# Patient Record
Sex: FEMALE
[De-identification: names, ages, dates, MRNs, and addresses within clinical notes are randomized per-mention and may not be internally consistent; named-entity substitution may affect disease eponyms.]

---

## 2020-02-27 ENCOUNTER — ASOB RESULT (OUTPATIENT)
Age: 35
End: 2020-02-27

## 2020-02-27 ENCOUNTER — APPOINTMENT (OUTPATIENT)
Dept: ANTEPARTUM | Facility: CLINIC | Age: 35
End: 2020-02-27
Payer: COMMERCIAL

## 2020-02-27 PROBLEM — Z00.00 ENCOUNTER FOR PREVENTIVE HEALTH EXAMINATION: Status: ACTIVE | Noted: 2020-02-27

## 2020-02-27 PROCEDURE — 76817 TRANSVAGINAL US OBSTETRIC: CPT

## 2020-04-13 ENCOUNTER — APPOINTMENT (OUTPATIENT)
Dept: ANTEPARTUM | Facility: CLINIC | Age: 35
End: 2020-04-13
Payer: COMMERCIAL

## 2020-04-13 ENCOUNTER — APPOINTMENT (OUTPATIENT)
Dept: MATERNAL FETAL MEDICINE | Facility: CLINIC | Age: 35
End: 2020-04-13

## 2020-04-13 ENCOUNTER — ASOB RESULT (OUTPATIENT)
Age: 35
End: 2020-04-13

## 2020-04-13 VITALS — TEMPERATURE: 99.1 F

## 2020-04-13 PROCEDURE — 36416 COLLJ CAPILLARY BLOOD SPEC: CPT

## 2020-04-13 PROCEDURE — 76813 OB US NUCHAL MEAS 1 GEST: CPT

## 2020-04-16 LAB
1ST TRIMESTER DATA: NORMAL
ADDENDUM DOC: NORMAL
AFP PNL SERPL: NORMAL
AFP SERPL-ACNC: NORMAL
CLINICAL BIOCHEMIST REVIEW: NORMAL
FREE BETA HCG 1ST TRIMESTER: NORMAL
Lab: NORMAL
NOTES NTD: NORMAL
NT: NORMAL
PAPP-A SERPL-ACNC: NORMAL
TRISOMY 18/3: NORMAL

## 2020-06-08 ENCOUNTER — APPOINTMENT (OUTPATIENT)
Dept: ANTEPARTUM | Facility: CLINIC | Age: 35
End: 2020-06-08
Payer: COMMERCIAL

## 2020-06-08 ENCOUNTER — ASOB RESULT (OUTPATIENT)
Age: 35
End: 2020-06-08

## 2020-06-08 PROCEDURE — 76805 OB US >/= 14 WKS SNGL FETUS: CPT

## 2020-06-26 ENCOUNTER — APPOINTMENT (OUTPATIENT)
Dept: ANTEPARTUM | Facility: CLINIC | Age: 35
End: 2020-06-26

## 2020-08-31 ENCOUNTER — APPOINTMENT (OUTPATIENT)
Dept: ANTEPARTUM | Facility: CLINIC | Age: 35
End: 2020-08-31

## 2021-06-28 ENCOUNTER — APPOINTMENT (OUTPATIENT)
Dept: OBGYN | Facility: CLINIC | Age: 36
End: 2021-06-28

## 2021-12-22 ENCOUNTER — TRANSCRIPTION ENCOUNTER (OUTPATIENT)
Age: 36
End: 2021-12-22

## 2022-04-19 ENCOUNTER — APPOINTMENT (OUTPATIENT)
Dept: OBGYN | Facility: CLINIC | Age: 37
End: 2022-04-19
Payer: COMMERCIAL

## 2022-04-19 VITALS
BODY MASS INDEX: 25.58 KG/M2 | HEIGHT: 62 IN | DIASTOLIC BLOOD PRESSURE: 62 MMHG | WEIGHT: 139 LBS | SYSTOLIC BLOOD PRESSURE: 110 MMHG

## 2022-04-19 DIAGNOSIS — Z82.49 FAMILY HISTORY OF ISCHEMIC HEART DISEASE AND OTHER DISEASES OF THE CIRCULATORY SYSTEM: ICD-10-CM

## 2022-04-19 DIAGNOSIS — Z78.9 OTHER SPECIFIED HEALTH STATUS: ICD-10-CM

## 2022-04-19 DIAGNOSIS — Z80.41 FAMILY HISTORY OF MALIGNANT NEOPLASM OF OVARY: ICD-10-CM

## 2022-04-19 DIAGNOSIS — Z80.3 FAMILY HISTORY OF MALIGNANT NEOPLASM OF BREAST: ICD-10-CM

## 2022-04-19 DIAGNOSIS — N84.1 POLYP OF CERVIX UTERI: ICD-10-CM

## 2022-04-19 DIAGNOSIS — Z01.411 ENCOUNTER FOR GYNECOLOGICAL EXAMINATION (GENERAL) (ROUTINE) WITH ABNORMAL FINDINGS: ICD-10-CM

## 2022-04-19 DIAGNOSIS — Z80.0 FAMILY HISTORY OF MALIGNANT NEOPLASM OF DIGESTIVE ORGANS: ICD-10-CM

## 2022-04-19 PROCEDURE — 57500 BIOPSY OF CERVIX: CPT

## 2022-04-19 PROCEDURE — 99385 PREV VISIT NEW AGE 18-39: CPT | Mod: 25

## 2022-04-26 PROBLEM — Z80.41 FAMILY HISTORY OF MALIGNANT NEOPLASM OF OVARY: Status: ACTIVE | Noted: 2022-04-19

## 2022-04-26 PROBLEM — Z80.3 FAMILY HISTORY OF MALIGNANT NEOPLASM OF BREAST: Status: ACTIVE | Noted: 2022-04-19

## 2022-04-26 PROBLEM — Z80.0 FAMILY HISTORY OF PANCREATIC CANCER: Status: ACTIVE | Noted: 2022-04-19

## 2022-04-26 PROBLEM — Z82.49 FAMILY HISTORY OF HYPERTENSION: Status: ACTIVE | Noted: 2022-04-19

## 2022-04-26 PROBLEM — Z78.9 NO PERTINENT PAST MEDICAL HISTORY: Status: RESOLVED | Noted: 2022-04-19 | Resolved: 2022-04-26

## 2022-04-28 ENCOUNTER — NON-APPOINTMENT (OUTPATIENT)
Age: 37
End: 2022-04-28

## 2022-04-28 DIAGNOSIS — O35.1XX0 MATERNAL CARE FOR (SUSPECTED) CHROMOSOMAL ABNORMALITY IN FETUS, NOT APPLICABLE OR UNSPECIFIED: ICD-10-CM

## 2022-04-28 LAB — CORE LAB BIOPSY: NORMAL

## 2022-04-29 ENCOUNTER — NON-APPOINTMENT (OUTPATIENT)
Age: 37
End: 2022-04-29

## 2024-03-29 ENCOUNTER — EMERGENCY (EMERGENCY)
Facility: HOSPITAL | Age: 39
LOS: 1 days | Discharge: HOME | End: 2024-03-29
Attending: EMERGENCY MEDICINE | Admitting: EMERGENCY MEDICINE
Payer: COMMERCIAL

## 2024-03-29 VITALS
SYSTOLIC BLOOD PRESSURE: 116 MMHG | OXYGEN SATURATION: 100 % | RESPIRATION RATE: 16 BRPM | TEMPERATURE: 98 F | DIASTOLIC BLOOD PRESSURE: 80 MMHG | HEART RATE: 99 BPM

## 2024-03-29 VITALS
RESPIRATION RATE: 20 BRPM | OXYGEN SATURATION: 98 % | SYSTOLIC BLOOD PRESSURE: 135 MMHG | WEIGHT: 134.92 LBS | DIASTOLIC BLOOD PRESSURE: 84 MMHG | TEMPERATURE: 98 F | HEART RATE: 103 BPM

## 2024-03-29 LAB
ALBUMIN SERPL ELPH-MCNC: 4.4 G/DL — SIGNIFICANT CHANGE UP (ref 3.3–5)
ALP SERPL-CCNC: 54 U/L — SIGNIFICANT CHANGE UP (ref 40–120)
ALT FLD-CCNC: 10 U/L — SIGNIFICANT CHANGE UP (ref 4–33)
ANION GAP SERPL CALC-SCNC: 13 MMOL/L — SIGNIFICANT CHANGE UP (ref 7–14)
APPEARANCE UR: CLEAR — SIGNIFICANT CHANGE UP
AST SERPL-CCNC: 19 U/L — SIGNIFICANT CHANGE UP (ref 4–32)
BACTERIA # UR AUTO: NEGATIVE /HPF — SIGNIFICANT CHANGE UP
BASOPHILS # BLD AUTO: 0.03 K/UL — SIGNIFICANT CHANGE UP (ref 0–0.2)
BASOPHILS NFR BLD AUTO: 0.3 % — SIGNIFICANT CHANGE UP (ref 0–2)
BILIRUB SERPL-MCNC: 0.4 MG/DL — SIGNIFICANT CHANGE UP (ref 0.2–1.2)
BILIRUB UR-MCNC: NEGATIVE — SIGNIFICANT CHANGE UP
BLD GP AB SCN SERPL QL: NEGATIVE — SIGNIFICANT CHANGE UP
BUN SERPL-MCNC: 8 MG/DL — SIGNIFICANT CHANGE UP (ref 7–23)
CALCIUM SERPL-MCNC: 9.5 MG/DL — SIGNIFICANT CHANGE UP (ref 8.4–10.5)
CAST: 0 /LPF — SIGNIFICANT CHANGE UP (ref 0–4)
CHLORIDE SERPL-SCNC: 105 MMOL/L — SIGNIFICANT CHANGE UP (ref 98–107)
CO2 SERPL-SCNC: 22 MMOL/L — SIGNIFICANT CHANGE UP (ref 22–31)
COLOR SPEC: YELLOW — SIGNIFICANT CHANGE UP
CREAT SERPL-MCNC: 0.59 MG/DL — SIGNIFICANT CHANGE UP (ref 0.5–1.3)
DIFF PNL FLD: ABNORMAL
EGFR: 119 ML/MIN/1.73M2 — SIGNIFICANT CHANGE UP
EOSINOPHIL # BLD AUTO: 0.02 K/UL — SIGNIFICANT CHANGE UP (ref 0–0.5)
EOSINOPHIL NFR BLD AUTO: 0.2 % — SIGNIFICANT CHANGE UP (ref 0–6)
GLUCOSE SERPL-MCNC: 94 MG/DL — SIGNIFICANT CHANGE UP (ref 70–99)
GLUCOSE UR QL: NEGATIVE MG/DL — SIGNIFICANT CHANGE UP
HCG SERPL-ACNC: 15.7 MIU/ML — SIGNIFICANT CHANGE UP
HCT VFR BLD CALC: 35.4 % — SIGNIFICANT CHANGE UP (ref 34.5–45)
HGB BLD-MCNC: 12.7 G/DL — SIGNIFICANT CHANGE UP (ref 11.5–15.5)
IANC: 7.89 K/UL — HIGH (ref 1.8–7.4)
IMM GRANULOCYTES NFR BLD AUTO: 0.4 % — SIGNIFICANT CHANGE UP (ref 0–0.9)
KETONES UR-MCNC: NEGATIVE MG/DL — SIGNIFICANT CHANGE UP
LEUKOCYTE ESTERASE UR-ACNC: ABNORMAL
LYMPHOCYTES # BLD AUTO: 1.64 K/UL — SIGNIFICANT CHANGE UP (ref 1–3.3)
LYMPHOCYTES # BLD AUTO: 16.4 % — SIGNIFICANT CHANGE UP (ref 13–44)
MCHC RBC-ENTMCNC: 26.9 PG — LOW (ref 27–34)
MCHC RBC-ENTMCNC: 35.9 GM/DL — SIGNIFICANT CHANGE UP (ref 32–36)
MCV RBC AUTO: 75 FL — LOW (ref 80–100)
MONOCYTES # BLD AUTO: 0.38 K/UL — SIGNIFICANT CHANGE UP (ref 0–0.9)
MONOCYTES NFR BLD AUTO: 3.8 % — SIGNIFICANT CHANGE UP (ref 2–14)
NEUTROPHILS # BLD AUTO: 7.89 K/UL — HIGH (ref 1.8–7.4)
NEUTROPHILS NFR BLD AUTO: 78.9 % — HIGH (ref 43–77)
NITRITE UR-MCNC: NEGATIVE — SIGNIFICANT CHANGE UP
NRBC # BLD: 0 /100 WBCS — SIGNIFICANT CHANGE UP (ref 0–0)
NRBC # FLD: 0 K/UL — SIGNIFICANT CHANGE UP (ref 0–0)
PH UR: 6.5 — SIGNIFICANT CHANGE UP (ref 5–8)
PLATELET # BLD AUTO: 219 K/UL — SIGNIFICANT CHANGE UP (ref 150–400)
POTASSIUM SERPL-MCNC: 3.8 MMOL/L — SIGNIFICANT CHANGE UP (ref 3.5–5.3)
POTASSIUM SERPL-SCNC: 3.8 MMOL/L — SIGNIFICANT CHANGE UP (ref 3.5–5.3)
PROT SERPL-MCNC: 7.3 G/DL — SIGNIFICANT CHANGE UP (ref 6–8.3)
PROT UR-MCNC: SIGNIFICANT CHANGE UP MG/DL
RBC # BLD: 4.72 M/UL — SIGNIFICANT CHANGE UP (ref 3.8–5.2)
RBC # FLD: 13.7 % — SIGNIFICANT CHANGE UP (ref 10.3–14.5)
RBC CASTS # UR COMP ASSIST: 27 /HPF — HIGH (ref 0–4)
RH IG SCN BLD-IMP: POSITIVE — SIGNIFICANT CHANGE UP
SODIUM SERPL-SCNC: 140 MMOL/L — SIGNIFICANT CHANGE UP (ref 135–145)
SP GR SPEC: 1.01 — SIGNIFICANT CHANGE UP (ref 1–1.03)
SQUAMOUS # UR AUTO: 0 /HPF — SIGNIFICANT CHANGE UP (ref 0–5)
UROBILINOGEN FLD QL: 0.2 MG/DL — SIGNIFICANT CHANGE UP (ref 0.2–1)
WBC # BLD: 10 K/UL — SIGNIFICANT CHANGE UP (ref 3.8–10.5)
WBC # FLD AUTO: 10 K/UL — SIGNIFICANT CHANGE UP (ref 3.8–10.5)
WBC UR QL: 2 /HPF — SIGNIFICANT CHANGE UP (ref 0–5)

## 2024-03-29 PROCEDURE — 93010 ELECTROCARDIOGRAM REPORT: CPT

## 2024-03-29 PROCEDURE — 99284 EMERGENCY DEPT VISIT MOD MDM: CPT

## 2024-03-29 PROCEDURE — 76817 TRANSVAGINAL US OBSTETRIC: CPT | Mod: 26

## 2024-03-29 NOTE — ED PROVIDER NOTE - PHYSICAL EXAMINATION
GENERAL: NAD  HEAD: normocephalic, atraumatic  HEENT: normal conjunctiva, oral mucosa moist, uvula midline, neck supple  CARDIAC: regular rate and rhythm, normal S1S2, no appreciable murmurs  PULM: speaking in full sentences, normal breath sounds, clear to ascultation bilaterally, no rales, rhonchi, wheezing  GI: abdomen nondistended, soft, nontender  : no CVA tenderness b/l, mild suprapubic tenderness, vaginal exam with blood in vaginal vault, no CMT, closed cervical os  NEURO: moving all 4 extremities, no focal deficits, normal speech, AOx3  MSK: no peripheral edema, no calf tenderness b/l  SKIN: well-perfused, extremities warm  PSYCH: appropriate mood and affect

## 2024-03-29 NOTE — ED PROVIDER NOTE - PATIENT PORTAL LINK FT
You can access the FollowMyHealth Patient Portal offered by [facility] by registering at the following website: [email or web address]. By joining [facility]’s FollowMyHealth portal, you will also be able to view your health information using other applications (apps) compatible with our system.

## 2024-03-29 NOTE — ED ADULT NURSE NOTE - OBJECTIVE STATEMENT
Pt received to intake presents about 4 weeks pregnant with c/c of vaginal bleeding and abd cramping, pregnancy confirmed via home pregnancy test. Pt a&ox4, ambulatory at baseline, skin intact, respirations even and unlabored, 20g IV placed in left arm labs drawn and sent. denies chest pain, dizziness, sob ,or any other symptoms.

## 2024-03-29 NOTE — ED PROVIDER NOTE - NSFOLLOWUPINSTRUCTIONS_ED_ALL_ED_FT
Today in the emergency department you were evaluated for vaginal bleeding in early pregnancy.  Please follow-up with your OB/GYN on Monday for repeat blood test as well as ultrasound.  Please bring a copy of your results with you.  Please return to the emergency department for worsening of your symptoms.    You may take Acetaminophen over the counter as needed for pain and/or fever. Use as directed and see medication warnings.    DISCHARGE INSTRUCTIONS:  Seek care immediately if:  You develop a severe headache that does not go away.  You have new or increased vision changes, such as blurred or spotted vision  You have new or increased swelling in your face or hands.  You have pain or cramping in your abdomen or low back.  You have increased vaginal bleeding.    Call your doctor or obstetrician if:  You have abdominal cramps, pressure, or tightening.  You have a change in vaginal discharge.  You cannot keep food or drinks down, and you are losing weight.  You have chills or a fever.  You have vaginal itching, burning, or pain.  You have yellow, green, white, or foul-smelling vaginal discharge.  You have pain or burning when you urinate, less urine than usual, or pink or bloody urine.  You have questions or concerns about your condition or care.

## 2024-03-29 NOTE — ED PROVIDER NOTE - CLINICAL SUMMARY MEDICAL DECISION MAKING FREE TEXT BOX
38-year-old female G2, P1 presenting with vaginal bleeding for the last 2 days. Concern for missed  versus subchorionic hemorrhage.  Labs, TVUS, UA/urine culture ordered.

## 2024-03-29 NOTE — ED PROVIDER NOTE - DISPOSITION TYPE
DISCHARGE How Severe Is Your Skin Lesion?: moderate Has Your Skin Lesion Been Treated?: not been treated Is This A New Presentation, Or A Follow-Up?: Growth

## 2024-03-29 NOTE — ED ADULT TRIAGE NOTE - CHIEF COMPLAINT QUOTE
Patient presents to ED with complaints of vaginal bleed. Reports receiving 4 positive at home pregnancy tests, approximately 4 weeks and 3 days pregnant as per PCP. Patient admits to using 1 pad since 11am, denies any dizziness, lightheadedness or SOB at this time. Respirations equal and unlabored on room air, patient able to speak in clear sentences.

## 2024-03-29 NOTE — ED PROVIDER NOTE - PROGRESS NOTE DETAILS
Lab work notable for hCG 15.7.  TVUS without evidence of intrauterine or extrauterine gestation.  UA without evidence of acute urinary tract infection.  Dispo to home with OB/GYN follow-up.

## 2024-03-29 NOTE — ED PROVIDER NOTE - OBJECTIVE STATEMENT
38-year-old female G2, P1 presenting with vaginal bleeding for the last 2 days.  Over the last few weeks patient has had 4 positive pregnancy tests.  She is approximately 4 weeks and her LMP.  However last night began experiencing vaginal bleeding.  Given the bleeding with her positive home pregnancy test she presented for further evaluation.  She has had cramping pelvic pain however no other headache, chest pain, shortness of breath, N/V/D, dysuria, peripheral edema, fever/chills.

## 2024-03-29 NOTE — ED PROVIDER NOTE - ATTENDING CONTRIBUTION TO CARE
DR. BLOCH, ATTENDING MD-  I performed a face to face bedside interview with patient regarding history of present illness, review of symptoms and past medical history. I completed an independent physical exam.  I have discussed patient's plan of care with the resident.  Patient well appearing NAD HEENT nml heart s1s2, lungs clear, abd soft nontender, pelvic supervised, minimal bleeding, os closed

## 2024-04-01 ENCOUNTER — APPOINTMENT (OUTPATIENT)
Dept: OBGYN | Facility: CLINIC | Age: 39
End: 2024-04-01
Payer: COMMERCIAL

## 2024-04-01 ENCOUNTER — ASOB RESULT (OUTPATIENT)
Age: 39
End: 2024-04-01

## 2024-04-01 VITALS
DIASTOLIC BLOOD PRESSURE: 81 MMHG | HEIGHT: 62 IN | SYSTOLIC BLOOD PRESSURE: 131 MMHG | WEIGHT: 135 LBS | BODY MASS INDEX: 24.84 KG/M2 | HEART RATE: 80 BPM

## 2024-04-01 DIAGNOSIS — O36.80X0 PREGNANCY WITH INCONCLUSIVE FETAL VIABILITY, NOT APPLICABLE OR UNSPECIFIED: ICD-10-CM

## 2024-04-01 DIAGNOSIS — O03.9 COMPLETE OR UNSPECIFIED SPONTANEOUS ABORTION W/OUT COMPLICATION: ICD-10-CM

## 2024-04-01 DIAGNOSIS — N63.0 UNSPECIFIED LUMP IN UNSPECIFIED BREAST: ICD-10-CM

## 2024-04-01 PROCEDURE — 76817 TRANSVAGINAL US OBSTETRIC: CPT

## 2024-04-01 PROCEDURE — 99213 OFFICE O/P EST LOW 20 MIN: CPT | Mod: 25

## 2024-04-02 LAB — HCG SERPL-MCNC: 2 MIU/ML

## 2024-04-22 ENCOUNTER — APPOINTMENT (OUTPATIENT)
Dept: OBGYN | Facility: CLINIC | Age: 39
End: 2024-04-22

## 2024-04-30 ENCOUNTER — APPOINTMENT (OUTPATIENT)
Dept: OBGYN | Facility: CLINIC | Age: 39
End: 2024-04-30

## 2024-05-21 ENCOUNTER — APPOINTMENT (OUTPATIENT)
Dept: OBGYN | Facility: CLINIC | Age: 39
End: 2024-05-21

## 2024-07-29 ENCOUNTER — NON-APPOINTMENT (OUTPATIENT)
Age: 39
End: 2024-07-29

## 2024-08-19 ENCOUNTER — APPOINTMENT (OUTPATIENT)
Dept: OBGYN | Facility: CLINIC | Age: 39
End: 2024-08-19

## 2024-08-19 VITALS
HEART RATE: 93 BPM | BODY MASS INDEX: 25.21 KG/M2 | HEIGHT: 62 IN | WEIGHT: 137 LBS | SYSTOLIC BLOOD PRESSURE: 117 MMHG | DIASTOLIC BLOOD PRESSURE: 75 MMHG

## 2024-08-19 DIAGNOSIS — Z12.4 ENCOUNTER FOR SCREENING FOR MALIGNANT NEOPLASM OF CERVIX: ICD-10-CM

## 2024-08-19 DIAGNOSIS — Z11.51 ENCOUNTER FOR SCREENING FOR HUMAN PAPILLOMAVIRUS (HPV): ICD-10-CM

## 2024-08-19 DIAGNOSIS — N76.0 ACUTE VAGINITIS: ICD-10-CM

## 2024-08-19 PROCEDURE — 99203 OFFICE O/P NEW LOW 30 MIN: CPT | Mod: 25

## 2024-08-19 PROCEDURE — G0444 DEPRESSION SCREEN ANNUAL: CPT | Mod: 59

## 2024-08-19 PROCEDURE — 99395 PREV VISIT EST AGE 18-39: CPT

## 2024-08-19 PROCEDURE — 99213 OFFICE O/P EST LOW 20 MIN: CPT | Mod: 25

## 2024-08-19 PROCEDURE — 99459 PELVIC EXAMINATION: CPT

## 2024-08-20 RX ORDER — FLUCONAZOLE 150 MG/1
150 TABLET ORAL
Qty: 2 | Refills: 0 | Status: ACTIVE | COMMUNITY
Start: 2024-08-20

## 2024-08-20 RX ORDER — NYSTATIN AND TRIAMCINOLONE ACETONIDE 100000; 1 MG/G; MG/G
100000-0.1 CREAM TOPICAL TWICE DAILY
Qty: 1 | Refills: 1 | Status: ACTIVE | COMMUNITY
Start: 2024-08-20 | End: 1900-01-01

## 2024-08-21 LAB — HPV HIGH+LOW RISK DNA PNL CVX: NOT DETECTED

## 2024-08-26 ENCOUNTER — NON-APPOINTMENT (OUTPATIENT)
Age: 39
End: 2024-08-26

## 2024-08-26 LAB
A VAGINAE DNA VAG QL NAA+PROBE: NORMAL
BVAB2 DNA VAG QL NAA+PROBE: NORMAL
C KRUSEI DNA VAG QL NAA+PROBE: NEGATIVE
C TRACH RRNA SPEC QL NAA+PROBE: NEGATIVE
CANDIDA DNA VAG QL NAA+PROBE: NEGATIVE
CYTOLOGY CVX/VAG DOC THIN PREP: NORMAL
MEGA1 DNA VAG QL NAA+PROBE: NORMAL
N GONORRHOEA RRNA SPEC QL NAA+PROBE: NEGATIVE
T VAGINALIS RRNA SPEC QL NAA+PROBE: NEGATIVE

## 2024-08-28 NOTE — HISTORY OF PRESENT ILLNESS
[Patient reported mammogram was abnormal] : Patient reported mammogram was abnormal [Patient reported breast sonogram was normal] : Patient reported breast sonogram was normal [Patient reported PAP Smear was normal] : Patient reported PAP Smear was normal [N] : Patient does not use contraception [Y] : Patient is sexually active [No] : Patient does not have concerns regarding sex [Currently Active] : currently active [Men] : men [TextBox_4] :  39-year-old female patient presenting for annual well woman exam and evaluation of vaginal discharge. [Mammogramdate] : 1/3/24 [TextBox_19] : 6 month f/up 7/29/24 neg  [BreastSonogramDate] : 1/3/24 [PapSmeardate] : 4/12/23 [HPVDate] : 4/12/23 [LMPDate] : 8/4/24 [FreeTextEntry1] : 8/4/24

## 2024-08-28 NOTE — PHYSICAL EXAM
[Chaperone Present] : A chaperone was present in the examining room during all aspects of the physical examination [97627] : A chaperone was present during the pelvic exam. [Appropriately responsive] : appropriately responsive [Alert] : alert [No Acute Distress] : no acute distress [Oriented x3] : oriented x3 [Examination Of The Breasts] : a normal appearance [No Masses] : no breast masses were palpable [Labia Majora] : normal [Labia Minora] : normal [Discharge] : a  ~M vaginal discharge was present [Moderate] : moderate [White] : white [Curds] : curd-like [Normal] : normal [Uterine Adnexae] : normal [No Lymphadenopathy] : no lymphadenopathy [Soft] : soft [Non-tender] : non-tender [Non-distended] : non-distended [Breast Palpation Diffuse Fibrous Tissue Bilateral] : fibrocystic changes [FreeTextEntry2] : MIRIAM Munguia [FreeTextEntry6] : Breast exam was unremarkable with no palpable masses or lymphadenopathy. [FreeTextEntry4] : vaginal discharge that is white, clumpy, and sticks to the vaginal walls s/p trip to [place] and lots of water sports

## 2024-08-28 NOTE — PLAN
[FreeTextEntry1] : GYN Annual evaluation today (+) Vaginitis -: - Advise on preventive measures for vaginal candidiasis such as avoiding irritants, wearing breathable cotton underwear, and avoiding tight clothing -pap smear sent  -vaginal cultures sent -reviewed with patient answered all questions and concerns Patient screened for depression -no signs of clinical depression PHQ-2 on file Rx given for mammogram and B sonogram RTO in 1 year for annual

## 2024-08-28 NOTE — DISCUSSION/SUMMARY
[FreeTextEntry1] :   I, Mervin sung acting as scribe for Dr. Corcoran. 08/19/2024     The documentation recorded by the scribe, in my presence, accurately reflects the service I personally performed, and the decisions made by me with my edits as appropriate on 08/28/2024  Saranya Corcoran MD, FACOG

## 2024-08-28 NOTE — PHYSICAL EXAM
Spine appears normal, range of motion is not limited, no muscle or joint tenderness. MAEx4. No lumbar midline tenderness. +B/l SLR 30 degrees. +LBP. [Chaperone Present] : A chaperone was present in the examining room during all aspects of the physical examination [[id number]] : A chaperone was present during the pelvic exam. [Appropriately responsive] : appropriately responsive [Alert] : alert [No Acute Distress] : no acute distress [Oriented x3] : oriented x3 [Examination Of The Breasts] : a normal appearance [No Masses] : no breast masses were palpable [Labia Majora] : normal [Labia Minora] : normal [Discharge] : a  ~M vaginal discharge was present [Moderate] : moderate [White] : white [Curds] : curd-like [Normal] : normal [Uterine Adnexae] : normal [No Lymphadenopathy] : no lymphadenopathy [Soft] : soft [Non-tender] : non-tender [Non-distended] : non-distended [Breast Palpation Diffuse Fibrous Tissue Bilateral] : fibrocystic changes [FreeTextEntry2] : MIRIAM Munguia [FreeTextEntry6] : Breast exam was unremarkable with no palpable masses or lymphadenopathy. [FreeTextEntry4] : vaginal discharge that is white, clumpy, and sticks to the vaginal walls s/p trip to [place] and lots of water sports

## 2025-04-27 NOTE — REVIEW OF SYSTEMS
[Patient Intake Form Reviewed] : Patient intake form was reviewed [Genital Rash/Irritation] : genital rash/irritation [Negative] : Heme/Lymph DC instructions